# Patient Record
Sex: FEMALE | Race: WHITE | NOT HISPANIC OR LATINO | Employment: STUDENT | URBAN - METROPOLITAN AREA
[De-identification: names, ages, dates, MRNs, and addresses within clinical notes are randomized per-mention and may not be internally consistent; named-entity substitution may affect disease eponyms.]

---

## 2024-06-21 ENCOUNTER — PATIENT MESSAGE (OUTPATIENT)
Dept: DIABETES | Facility: CLINIC | Age: 24
End: 2024-06-21
Payer: COMMERCIAL

## 2024-06-24 DIAGNOSIS — E11.65 TYPE 2 DIABETES MELLITUS WITH HYPERGLYCEMIA, WITHOUT LONG-TERM CURRENT USE OF INSULIN: Primary | ICD-10-CM

## 2024-06-26 DIAGNOSIS — E11.65 TYPE 2 DIABETES MELLITUS WITH HYPERGLYCEMIA, WITHOUT LONG-TERM CURRENT USE OF INSULIN: ICD-10-CM

## 2024-06-26 RX ORDER — BLOOD-GLUCOSE SENSOR
EACH MISCELLANEOUS
Qty: 3 EACH | Refills: 11 | Status: SHIPPED | OUTPATIENT
Start: 2024-06-26

## 2024-06-26 RX ORDER — INSULIN ASPART INJECTION 100 [IU]/ML
10 INJECTION, SOLUTION SUBCUTANEOUS
Qty: 5 PEN | Refills: 1 | Status: SHIPPED | OUTPATIENT
Start: 2024-06-26 | End: 2025-06-26

## 2024-07-01 ENCOUNTER — PATIENT MESSAGE (OUTPATIENT)
Dept: DIABETES | Facility: CLINIC | Age: 24
End: 2024-07-01
Payer: COMMERCIAL

## 2024-07-01 DIAGNOSIS — E11.65 TYPE 2 DIABETES MELLITUS WITH HYPERGLYCEMIA, WITHOUT LONG-TERM CURRENT USE OF INSULIN: ICD-10-CM

## 2024-07-02 ENCOUNTER — TELEPHONE (OUTPATIENT)
Dept: DIABETES | Facility: CLINIC | Age: 24
End: 2024-07-02
Payer: COMMERCIAL

## 2024-07-02 RX ORDER — BLOOD-GLUCOSE SENSOR
EACH MISCELLANEOUS
Qty: 3 EACH | Refills: 11 | Status: SHIPPED | OUTPATIENT
Start: 2024-07-02

## 2024-07-02 NOTE — TELEPHONE ENCOUNTER
Pharmacy called regarding medication status for G7     PA Approval for G7 Sensors    Case Id:[id number]  Approved Coverage Start Date:05/09/2024  Coverage End Date:05/09/2025      Spoke with Pharmacy demographic information in pharmacy profile does not match. Ran claim for DME still not processing.   I called patient back to verify insurance- [employer] ID-[id number]    Patient encouraged to call insurance and verify primary and secondary insurances and then reach out to pharmacy

## 2024-07-03 ENCOUNTER — PATIENT MESSAGE (OUTPATIENT)
Dept: DIABETES | Facility: CLINIC | Age: 24
End: 2024-07-03
Payer: COMMERCIAL

## 2024-07-03 ENCOUNTER — TELEPHONE (OUTPATIENT)
Dept: DIABETES | Facility: CLINIC | Age: 24
End: 2024-07-03
Payer: COMMERCIAL

## 2024-07-03 DIAGNOSIS — E11.65 TYPE 2 DIABETES MELLITUS WITH HYPERGLYCEMIA, WITHOUT LONG-TERM CURRENT USE OF INSULIN: ICD-10-CM

## 2024-07-03 RX ORDER — INSULIN ASPART 100 [IU]/ML
10 INJECTION, SOLUTION INTRAVENOUS; SUBCUTANEOUS
Qty: 15 ML | Refills: 5 | Status: SHIPPED | OUTPATIENT
Start: 2024-07-03 | End: 2025-07-03

## 2024-07-03 RX ORDER — TIRZEPATIDE 2.5 MG/.5ML
2.5 INJECTION, SOLUTION SUBCUTANEOUS
Qty: 4 PEN | Refills: 1 | Status: SHIPPED | OUTPATIENT
Start: 2024-07-03

## 2024-07-03 NOTE — TELEPHONE ENCOUNTER
Spoke with patient regarding insurance update, once updated patient reported that she is taking Fiasp and that every time she takes insulin-Fiasp she has noticed that her glucose levels become elevated. She voiced that she compares to CGM and meter and first she calibrated and afterwards she noticed that it was still elevated. Patient would like to be advised on whether or not she should keep taking Fiasp     Please advise thank you, dexcom uploaded for view

## 2024-07-03 NOTE — TELEPHONE ENCOUNTER
Followed up with patient regarding insurance  verification for Dexcom G7 and Fiasp insulin. Was provided number for [employer] [phone], spoke with Dorene.    Prior auth was submitted in May 09, 2024 CaseId:[id number];Status:Approved;Review Type:Prior Auth;Coverage Start Date:05/09/2024;Coverage End Date:05/09/2025; Payer: [employer] Patient's Payer Case ID: [id number]   [phone] through [employer] which is no longer vaild    A new PA was submitted verbally through [employer]   Dexcom [id number]  [id number]    Expedited review has been requested within 24 hours  Chart note faxed to [phone]

## 2024-07-13 ENCOUNTER — LAB VISIT (OUTPATIENT)
Dept: LAB | Facility: HOSPITAL | Age: 24
End: 2024-07-13
Payer: COMMERCIAL

## 2024-07-13 DIAGNOSIS — E11.65 TYPE 2 DIABETES MELLITUS WITH HYPERGLYCEMIA, WITHOUT LONG-TERM CURRENT USE OF INSULIN: ICD-10-CM

## 2024-07-13 LAB
ESTIMATED AVG GLUCOSE: 180 MG/DL (ref 68–131)
HBA1C MFR BLD: 7.9 % (ref 4–5.6)

## 2024-07-13 PROCEDURE — 36415 COLL VENOUS BLD VENIPUNCTURE: CPT | Mod: PO | Performed by: NURSE PRACTITIONER

## 2024-07-13 PROCEDURE — 83036 HEMOGLOBIN GLYCOSYLATED A1C: CPT | Performed by: NURSE PRACTITIONER

## 2024-07-15 ENCOUNTER — OFFICE VISIT (OUTPATIENT)
Dept: DIABETES | Facility: CLINIC | Age: 24
End: 2024-07-15
Payer: COMMERCIAL

## 2024-07-15 ENCOUNTER — PATIENT MESSAGE (OUTPATIENT)
Dept: DIABETES | Facility: CLINIC | Age: 24
End: 2024-07-15

## 2024-07-15 DIAGNOSIS — E11.65 TYPE 2 DIABETES MELLITUS WITH HYPERGLYCEMIA, WITHOUT LONG-TERM CURRENT USE OF INSULIN: Primary | ICD-10-CM

## 2024-07-15 DIAGNOSIS — E11.69 HYPERLIPIDEMIA ASSOCIATED WITH TYPE 2 DIABETES MELLITUS: ICD-10-CM

## 2024-07-15 DIAGNOSIS — E78.5 HYPERLIPIDEMIA ASSOCIATED WITH TYPE 2 DIABETES MELLITUS: ICD-10-CM

## 2024-07-15 PROCEDURE — 99214 OFFICE O/P EST MOD 30 MIN: CPT | Mod: 95,,, | Performed by: NURSE PRACTITIONER

## 2024-07-15 PROCEDURE — 3051F HG A1C>EQUAL 7.0%<8.0%: CPT | Mod: CPTII,95,, | Performed by: NURSE PRACTITIONER

## 2024-07-15 RX ORDER — INSULIN ASPART 100 [IU]/ML
10 INJECTION, SOLUTION INTRAVENOUS; SUBCUTANEOUS
Qty: 15 ML | Refills: 5 | Status: SHIPPED | OUTPATIENT
Start: 2024-07-15 | End: 2025-07-15

## 2024-07-15 RX ORDER — INSULIN GLARGINE 300 [IU]/ML
50 INJECTION, SOLUTION SUBCUTANEOUS DAILY
Qty: 2 PEN | Refills: 5 | Status: SHIPPED | OUTPATIENT
Start: 2024-07-15 | End: 2025-07-15

## 2024-07-15 RX ORDER — TIRZEPATIDE 2.5 MG/.5ML
2.5 INJECTION, SOLUTION SUBCUTANEOUS
Qty: 4 PEN | Refills: 1 | Status: SHIPPED | OUTPATIENT
Start: 2024-07-15

## 2024-07-15 RX ORDER — TIRZEPATIDE 5 MG/.5ML
5 INJECTION, SOLUTION SUBCUTANEOUS
Qty: 4 PEN | Refills: 5 | Status: SHIPPED | OUTPATIENT
Start: 2024-07-15

## 2024-07-15 NOTE — PROGRESS NOTES
Subjective:         Patient ID: Jana Luther is a 24 y.o. female.  Patient's current PCP is Agapito Jason MD.       Chief Complaint: No chief complaint on file.      HPI  Jana Luther is a 24 y.o. White female presenting for a follow up for diabetes. Patient has been diagnosed with diabetes since 2018 .    Complications related to diabetes:  HTN, Hyperlipidemia, ; denies Pancreatitis; denies Gastroparesis; denies DKA; denies Hx/family Hx of MEN2/MTC; reports Frequent UTIs/yeast infections (with menstrual cycles)    Past failed treatment include: Metformin- GI upset, Jardiance- yeast, ozempic- GI upset    Blood glucose testing is performed regularly, with the Dexcom- BG have been elevated due to missed week of Mounjaro/shortage    Compliance:The patient reports medication compliance and diet noncompliance some of the time.  Activity: Sedentary- none .     The patient location is: home, [place]  The chief complaint leading to consultation is: DM follow up    Visit type: audiovisual    Face to Face time with patient: 30 minutes of total time spent on the encounter, which includes face to face time and non-face to face time preparing to see the patient (eg, review of tests), Obtaining and/or reviewing separately obtained history, Documenting clinical information in the electronic or other health record, Independently interpreting results (not separately reported) and communicating results to the patient/family/caregiver, or Care coordination (not separately reported). Each patient to whom he or she provides medical services by telemedicine is:  (1) informed of the relationship between the physician and patient and the respective role of any other health care provider with respect to management of the patient; and (2) notified that he or she may decline to receive medical services by telemedicine and may withdraw from such care at any time.         //   , There is no height or weight on file to calculate BMI.  Her blood  "sugar in clinic today is:   Lab Results   Component Value Date    POCGLU 250 (A) 03/03/2022       Labs reviewed and are noted below.  Her most recent A1C is:  Lab Results   Component Value Date    HGBA1C 7.9 (H) 07/13/2024    HGBA1C 7.5 (H) 04/09/2024    HGBA1C 8.5 (H) 12/09/2023     No results found for: "CPEPTIDE"  No results found for: "GLUTAMICACID"  Glucose   Date Value Ref Range Status   12/09/2023 254 (H) 70 - 110 mg/dL Final     Anion Gap   Date Value Ref Range Status   12/09/2023 9 8 - 16 mmol/L Final     eGFR if    Date Value Ref Range Status   07/12/2022 >60 >60 mL/min/1.73 m^2 Final     eGFR if non    Date Value Ref Range Status   07/12/2022 >60 >60 mL/min/1.73 m^2 Final     Comment:     Calculation used to obtain the estimated glomerular filtration  rate (eGFR) is the CKD-EPI equation.        Diabetes Management Status    Statin: Not taking  ACE/ARB: Not taking    Screening or Prevention Patient's value Goal Complete/Controlled?   HgA1C Testing and Control   Lab Results   Component Value Date    HGBA1C 7.9 (H) 07/13/2024      Annually/Less than 8% No   Lipid profile : 12/09/2023 Annually Yes   LDL control Lab Results   Component Value Date    LDLCALC 117.2 12/09/2023    Annually/Less than 100 mg/dl  No   Nephropathy screening Lab Results   Component Value Date    LABMICR 22.0 12/09/2023     No results found for: "PROTEINUA"  No results found for: "UTPCR"   Annually No   Blood pressure BP Readings from Last 1 Encounters:   01/30/24 122/80    Less than 140/90 Yes   Dilated retinal exam : 06/18/2021 Annually No   Foot exam   : 01/19/2024 Annually No       CURRENT DM MEDICATIONS:   Diabetes Medications               insulin (LANTUS SOLOSTAR U-100 INSULIN) glargine 100 units/mL SubQ pen Inject 42  Units into the skin every evening.          Review of Systems   Constitutional:  Negative for activity change and appetite change.        No symptoms of dehydration   Eyes:  Negative " for visual disturbance.   Gastrointestinal:  Positive for constipation (mild occasional hard stools). Negative for abdominal pain, diarrhea, nausea and vomiting.   Endocrine: Negative for polydipsia, polyphagia and polyuria.   Genitourinary:  Negative for dysuria and vaginal discharge.   Neurological:  Negative for syncope and weakness.   Psychiatric/Behavioral:  Negative for confusion.          Objective:      Physical Exam  Constitutional:       General: She is not in acute distress.     Appearance: She is not ill-appearing, toxic-appearing or diaphoretic.   Neurological:      Mental Status: She is alert.         Assessment:       1. Type 2 diabetes mellitus with hyperglycemia, without long-term current use of insulin    2. Hyperlipidemia associated with type 2 diabetes mellitus               Plan:   Type 2 diabetes mellitus with hyperglycemia, without long-term current use of insulin    Hyperlipidemia associated with type 2 diabetes mellitus          PLAN:   - Condition: uncontrolled    - Monitor blood glucose:  2x daily.Goals reviewed.   - Hypoglycemia protocol: reviewed routinely; risk discussed  - Diet: reviewed; Encouraged healthy low fat, low carb diet and increase physical activity  - BP and LDL- Rec lifestyle modifications and follow up with PCP for management.   - Medication Changes:   Increase - Mounjaro 5 mg once weekly. Continue Lantus to 50 units, Start Novolog once finished with Fiasp- pt wanted to finish supply  - I explained the importance of routine foot and eye exams, advised to see PCP annually for physical exams and monitoring for any drug related complications   - ACE/ARB recommended unless contraindicated   - The patient was explained the above plan and given opportunity to ask questions.  She understands, chooses and consents to this plan and accepts all the risks, which include but are not limited to the risks mentioned above.   - Labs ordered as above  - Nurse visit:  deferred  -she will  notify me via my chart if she is not able to get the Mounjaro 5 mg - so that insulin can be adjusted  - Follow up: 6 weeks- to discuss BG and Op5      Fiasp/ Novolog sliding scale        If blood pre-meal sugar is 151-200 take +2 units of Fiasp/Novolog  If blood pre-meal sugar is 201-250 take +4 units   If blood pre-meal sugar is 251-300 take +6 units   If blood pre-meal sugar is 301-350 take +8 units   If blood pre-meal sugar is 351-400+ take +10 units         A total of 30 minutes was spent in face to face time, of which over 50% was spent in counseling patient on disease process, complications, treatment, and side effects of medications.

## 2024-07-16 ENCOUNTER — TELEPHONE (OUTPATIENT)
Dept: DIABETES | Facility: CLINIC | Age: 24
End: 2024-07-16
Payer: COMMERCIAL

## 2024-07-17 DIAGNOSIS — E11.65 TYPE 2 DIABETES MELLITUS WITH HYPERGLYCEMIA, WITHOUT LONG-TERM CURRENT USE OF INSULIN: ICD-10-CM

## 2024-07-17 RX ORDER — BLOOD-GLUCOSE SENSOR
EACH MISCELLANEOUS
Qty: 3 EACH | Refills: 11 | Status: SHIPPED | OUTPATIENT
Start: 2024-07-17

## 2024-08-06 ENCOUNTER — PATIENT MESSAGE (OUTPATIENT)
Dept: DIABETES | Facility: CLINIC | Age: 24
End: 2024-08-06
Payer: COMMERCIAL

## 2024-08-07 ENCOUNTER — TELEPHONE (OUTPATIENT)
Dept: DIABETES | Facility: CLINIC | Age: 24
End: 2024-08-07
Payer: COMMERCIAL

## 2024-08-07 DIAGNOSIS — E11.65 TYPE 2 DIABETES MELLITUS WITH HYPERGLYCEMIA, WITHOUT LONG-TERM CURRENT USE OF INSULIN: ICD-10-CM

## 2024-08-07 DIAGNOSIS — E11.65 TYPE 2 DIABETES MELLITUS WITH HYPERGLYCEMIA, WITHOUT LONG-TERM CURRENT USE OF INSULIN: Primary | ICD-10-CM

## 2024-08-07 RX ORDER — DIABETIC SUPPLIES,MISCELL
1 MISCELLANEOUS MISCELLANEOUS ONCE
Qty: 1 EACH | Status: SHIPPED | OUTPATIENT
Start: 2024-08-07 | End: 2024-08-07 | Stop reason: SDUPTHER

## 2024-08-07 RX ORDER — BOLUS INSULIN PUMP, 200 UNIT 2 UNIT
EACH MISCELLANEOUS
Qty: 1 EACH | Refills: 11 | Status: SHIPPED | OUTPATIENT
Start: 2024-08-07 | End: 2024-08-08 | Stop reason: SDUPTHER

## 2024-08-07 RX ORDER — DIABETIC SUPPLIES,MISCELL
1 MISCELLANEOUS MISCELLANEOUS ONCE
Qty: 1 EACH | Refills: 0 | Status: SHIPPED | OUTPATIENT
Start: 2024-08-07 | End: 2024-08-08 | Stop reason: SDUPTHER

## 2024-08-08 DIAGNOSIS — E11.65 TYPE 2 DIABETES MELLITUS WITH HYPERGLYCEMIA, WITHOUT LONG-TERM CURRENT USE OF INSULIN: ICD-10-CM

## 2024-08-08 RX ORDER — DIABETIC SUPPLIES,MISCELL
1 MISCELLANEOUS MISCELLANEOUS ONCE
Qty: 1 EACH | Refills: 0 | Status: SHIPPED | OUTPATIENT
Start: 2024-08-08 | End: 2024-08-08

## 2024-08-08 RX ORDER — BOLUS INSULIN PUMP, 200 UNIT 2 UNIT
EACH MISCELLANEOUS
Qty: 1 EACH | Refills: 11 | Status: SHIPPED | OUTPATIENT
Start: 2024-08-08

## 2024-08-15 ENCOUNTER — TELEPHONE (OUTPATIENT)
Dept: DIABETES | Facility: CLINIC | Age: 24
End: 2024-08-15
Payer: COMMERCIAL

## 2024-08-15 NOTE — TELEPHONE ENCOUNTER
----- Message from Salvatore Edwards sent at 8/15/2024  9:04 AM CDT -----  Contact: [phone]  Type:  Patient Returning Call    Who Called:LANDON DAILEY [[id number]]  Who Left Message for Patient:  Does the patient know what this is regarding?:missed a call from your office  Would the patient rather a call back or a response via [facility]? Call back  Best Call Back Number: [phone]  Additional Information: n [phone]

## 2024-08-28 ENCOUNTER — OFFICE VISIT (OUTPATIENT)
Dept: DIABETES | Facility: CLINIC | Age: 24
End: 2024-08-28
Payer: COMMERCIAL

## 2024-08-28 DIAGNOSIS — E11.65 TYPE 2 DIABETES MELLITUS WITH HYPERGLYCEMIA, WITHOUT LONG-TERM CURRENT USE OF INSULIN: Primary | ICD-10-CM

## 2024-08-28 DIAGNOSIS — E11.69 HYPERLIPIDEMIA ASSOCIATED WITH TYPE 2 DIABETES MELLITUS: ICD-10-CM

## 2024-08-28 DIAGNOSIS — E78.5 HYPERLIPIDEMIA ASSOCIATED WITH TYPE 2 DIABETES MELLITUS: ICD-10-CM

## 2024-08-28 PROCEDURE — 1160F RVW MEDS BY RX/DR IN RCRD: CPT | Mod: CPTII,95,, | Performed by: NURSE PRACTITIONER

## 2024-08-28 PROCEDURE — 3051F HG A1C>EQUAL 7.0%<8.0%: CPT | Mod: CPTII,95,, | Performed by: NURSE PRACTITIONER

## 2024-08-28 PROCEDURE — 1159F MED LIST DOCD IN RCRD: CPT | Mod: CPTII,95,, | Performed by: NURSE PRACTITIONER

## 2024-08-28 PROCEDURE — 99214 OFFICE O/P EST MOD 30 MIN: CPT | Mod: 95,,, | Performed by: NURSE PRACTITIONER

## 2024-09-05 ENCOUNTER — CLINICAL SUPPORT (OUTPATIENT)
Dept: DIABETES | Facility: CLINIC | Age: 24
End: 2024-09-05
Payer: COMMERCIAL

## 2024-09-05 DIAGNOSIS — E11.65 TYPE 2 DIABETES MELLITUS WITH HYPERGLYCEMIA, WITHOUT LONG-TERM CURRENT USE OF INSULIN: Primary | ICD-10-CM

## 2024-09-05 PROCEDURE — 99999 PR PBB SHADOW E&M-EST. PATIENT-LVL II: CPT | Mod: PBBFAC,,, | Performed by: DIETITIAN, REGISTERED

## 2024-09-06 ENCOUNTER — TELEPHONE (OUTPATIENT)
Dept: DIABETES | Facility: CLINIC | Age: 24
End: 2024-09-06
Payer: COMMERCIAL

## 2024-09-06 VITALS — WEIGHT: 261.38 LBS | HEIGHT: 67 IN | BODY MASS INDEX: 41.02 KG/M2

## 2024-09-06 DIAGNOSIS — E11.65 TYPE 2 DIABETES MELLITUS WITH HYPERGLYCEMIA, WITHOUT LONG-TERM CURRENT USE OF INSULIN: Primary | ICD-10-CM

## 2024-09-06 NOTE — TELEPHONE ENCOUNTER
Adam May, patient was seen yesterday for pump eval for Omnipod 5 and she agrees to move forward with this insulin pump.  She would like to stay on Dexcom G7 to integrate with the Omnipod 5 system which it is my understanding that it still can only be ordered through [facility].   Food Recall of 9/5/24 was completed with accompanying carbohydrate count.  Patient is taking 14 units of Fiasp/Novolog before each meal she consumes at home, is not utilizing the chart you prescribed, and skips lunch doses. Confirmed she is capable of carbohydrate counting. She was instructed to add insulin dose and carbohydrate counts to Dexcom events for your calculations for insulin pump start orders.  Dexcom report attached.      Marija, diabetes education

## 2024-09-06 NOTE — PROGRESS NOTES
"Diabetes Care Specialist Progress Note  Author: Marija Gibbs RD  Date: 9/6/2024    Intake    Program Intake  Reason for Diabetes Program Visit:: Intervention  Type of Intervention:: Individual  Individual: Education  Education: Insulin Pump Evaluation  Current diabetes risk level:: low  In the last 12 months, have you:: none  Permission to speak with others about care:: no    Current Diabetes Treatment: Insulin, DM Injectables  DM Injectables Type/Dose: Mounjaro 5 mg/weekly  Method of insulin delivery?: Injections  Injection Type: Pens  Pen Type/Dose: 10 units Fiasp/Novolog /25.  She is using flat doses of 14 units. 40 units of Toujeo Max.    Continuous Glucose Monitoring  Patient has CGM: Yes  Personal CGM type:: Dexcom G7  GMI Date: 09/05/24  GMI Value: 7.5 %      Lab Results   Component Value Date    HGBA1C 7.9 (H) 07/13/2024       Weight: 118.6 kg (261 lb 6.4 oz)   Height: 5' 7" (170.2 cm)   Body mass index is 40.94 kg/m².    Lifestyle Coping Support & Clinical    Lifestyle/Coping/Support  List anything about Diabetes that causes you stress?: remembering to take Fiasp before meals away from home  Learning Barriers:: None  Culture or Mu-ism beliefs that may impact ability to access healthcare: No  Psychosocial/Coping Skills Assessment Completed: : Yes  Assessment indicates:: Adequate understanding  Area of need?: No    Problem Review  Active Comorbidities: Hyperlipidemia/Dyslipidemia    Diabetes Self-Management Skills Assessment    Medication Skills Assessment  Patient is able to identify current diabetes medications, dosages, and appropriate timing of medications.: yes  Patient reports problems or concerns with current medication regimen.: yes  Medication regimen problems/concerns:: does not feel like regimen is working  Patient is  aware that some diabetes medications can cause low blood sugar?: Yes  Medication Skills Assessment Completed:: Yes  Assessment indicates:: Instruction Needed, Adequate " understanding  Area of need?: Yes    Diabetes Disease Process/Treatment Options  Diabetes Type?: Type II  When were you diagnosed?: 2018  What are your goals for this education session?: pump evaluation  Is patient aware of what causes diabetes?: Yes  Does patient understand the pathophysiology of diabetes?: Yes  Area of need?: No    Nutrition/Healthy Eating  Meal Plan 24 Hour Recall - Breakfast: V8 pomegranate blueberry energy drink 20 g carbohydrates  Meal Plan 24 Hour Recall - Lunch: Small rocker apple 10 g carbohydrates, 2 flatbread everything seasoned crackers 17 g carbohydratess, salad with everything seasoned dressing 21 g carbohydrates, 1/2 cup cumcumber slices 2 g carbohydrates, and 1/2 cup tomato slices 3 g carbohydrates, 1 tablespoon sunflower seeds 2 g carbohydrates., 8 gummy bears 22 g carbohydrates  Meal Plan 24 Hour Recall - Beverage: water  Who shops/cooks?: patient and wife  Patient can identify foods that impact blood sugar.: yes  Challenges to healthy eating:: other (see comments) (skipping breakfast)  Nutrition/Healthy Eating Skills Assessment Completed:: Yes  Assessment indicates:: Instruction Needed  Area of need?: Yes    Physical Activity/Exercise  Patient's daily activity level:: moderately active  Patient formally exercises outside of work.: yes  Frequency: twice a week  Patient can identify forms of physical activity.: yes  Physical Activity/Exercise Skills Assessment Completed: : Yes  Assessment indicates:: Adequate understanding  Area of need?: No    Home Blood Glucose Monitoring  Patient states that blood sugar is checked at home daily.: yes  Monitoring Method:: personal continuous glucose monitor  Personal CGM type:: Dexcom G7   What is your current Time in Range?: 57%  What is your A1c Target?: >7%  Home Blood Glucose Monitoring Skills Assessment Completed: : Yes  Assessment indicates:: Adequate understanding  Area of need?: No    Assessment Summary and Plan    Based on today's  diabetes care assessment, the following areas of need were identified:          9/5/2024    12:01 AM   Areas of Need   Medications/Current Diabetes Treatment Yes, see care plan   Lifestyle Coping Support No   Diabetes Disease Process/Treatment Options No   Nutrition/Healthy Eating Yes, see care plan   Physical Activity/Exercise No   Home Blood Glucose Monitoring Continue use of Dexcom G7       Today's interventions were provided through individual discussion, instruction, and written materials were provided.      Patient verbalized understanding of instruction and written materials.  Pt was able to return back demonstration of instructions today. Patient understood key points, needs reinforcement and further instruction.     Diabetes Self-Management Care Plan:    Today's Diabetes Self-Management Care Plan was developed with Jana's input. Jana has agreed to work toward the following goal(s) to improve his/her overall diabetes control.      Care Plan: Diabetes Management   Updates made since 8/7/2024 12:00 AM        Problem: Medications         Goal: Patient Agrees to consider Omnipod 5 to manage blood glucose levels.    Start Date: 9/5/2024   Expected End Date: 11/7/2024   Priority: High   Barriers: No Barriers Identified   Note:    Patient is interested in an insulin pump and seen today to discuss insulin pump therapy.     -Covered expectations for insulin pump approval by provider and insurance company such as: SMBG a min of qid, additional labs, insurance verification, possible costs associated with monthly pump supplies, and overall cost.     --Discussed basic details of pump therapy with patient.     -Reviewed current insulin pumps available: Medtronic 630G/770G/780G, Tandem T-slim X2, OmniPod Classic/Dash/5, and VGo    -Reviewed hybrid closed-loop insulin pump systems.  Hybrid closed-loop systems have some hands-off (automated) actions. These capabilities can vary among systems. That said, basic communication  happens between the CGM and pump, which can trigger some automatic adjustments. This helps to keep your glucose within a preset range. Pumps with this capability include: Medtronic 770G/780G with Guardian 3 or 4 CGM, Tandem T-Slim X2 integrated with Dexcom G6 CGM (Control IQ technology), and OmniPod 5 integrated with Dexcom G6 CGM    -Reviewed terms such as insulin sensitivity factor (ISF), carbohydrate ratio, target glucose, bolus, basal, active insulin and insulin on board.  Explained importance of learning to carb count at meals to effectively enter carbs (grams) when bolusing at meals.      -Explained each insulin pumps allow for different max volumes of insulin in reservoir chamber. Medtronic and Tandem max volume is 300 units and Omnipod max volume is 200 units.     -Patient verbalized understanding of pump therapy and able to see and touch all demonstration pumps during visit today.      Patient's is interested in Omnipod 5.    Patient's major concern is insurance coverage and would like to move forward with checking insurance coverage for Omniopd 5.  Will send patient's request for prescription for Omnipod 5 to [facility] with patient's permission today.  Patient given booklets on Omnipod 5 products and asked to research the products encouraging patient to contact us with any additional questions.          Task: Reviewed with patient all current diabetes medications and provided basic review of the purpose, dosage, frequency, side effects, and storage of both oral and injectable diabetes medications.         Task: Reviewed possible resources for acquiring cost prohibitive medication.         Task: Instructed patient on how to self-administer         Task: Discussed guidelines for preventing, detecting and treating hypoglycemia and hyperglycemia and reviewed the importance of meal and medication timing with diabetes mediations for prevention of hypoglycemia and maximum drug benefit.         Problem: Healthy  "Eating         Goal: Eat 3 meals daily with 30-45g + protein. Patient will start carbohydrate counting and entering into Dexcom events.    Start Date: 9/5/2024   Expected End Date: 11/7/2024   Priority: Medium   Barriers: No Barriers Identified   Note:    Patient confirmed to be able to accurately count carbohydrates. Discussed need to start practicing and entering into Dexcom events along with her insulin doses.       Task: Reviewed the sources and role of Carbohydrate, Protein, and Fat and how each nutrient impacts blood sugar. Completed 9/6/2024        Task: Provided visual examples using dry measuring cups, food models, and other familiar objects such as computer mouse, deck or cards, tennis ball etc. to help with visualization of portions.         Task: Explained how to count carbohydrates using the food label and the use of dry measuring cups for accurate carb counting. Completed 9/6/2024        Task: Discussed strategies for choosing healthier menu options when dining out.         Task: Recommended replacing beverages containing high sugar content with noncaloric/sugar free options and/or water.         Task: Review the importance of balancing carbohydrates with each meal using portion control techniques to count servings of carbohydrate and label reading to identify serving size and amount of total carbs per serving. Completed 9/6/2024        Task: Provided Sample plate method and reviewed the use of the plate to estimate amounts of carbohydrate per meal. Completed 9/6/2024          Follow Up Plan     Follow up when she receives Omnipod 5 supplies to be scheduled for pump start..  Patient presents today to learn more about Omnipod 5 to manage her BG.  She currently uses Dexcom G7 and would like to continue to do so.  Will request her Rx to be sent to [facility] since these pods are available through that pharmacy only.        Patient does not follow her prescribed Fiasp/Novolog doses because "I feel better " "when I run at 120" mg/dl.  She reports "it does not give me enough insulin." She administers 14 units at each meal she consumes at home.    Carbohydrate count for intake of today provided below.  Confirmed patient can accurately carbohydrate count.  Patient instructed to start adding carbohydrate count of meals to Dexcom events.    Recall for 9/5/24:   Breakfast:   V8 pomegranate blueberry energy drink. 20 g total carbohydrates. 14 units insulin.     Lunch:  Small rocker apple 10 g carbohydrates, 2 flatbread everything seasoned crackers 17 g carbohydratess, salad with everything seasoned dressing 21 g carbohydrates, 1/2 cup cumcumber slices 2 g carbohydrates, and 1/2 cup tomato slices 3 g carbohydrates, 1 tablespoon sunflower seeds 2 g carbohydrates., 8 gummy bears 22 g carbohydrates. Total carbohydrates 77 g. 0 units insulin.             Today's care plan and follow up schedule was discussed with patient.  Jana verbalized understanding of the care plan, goals, and agrees to follow up plan.        The patient was encouraged to communicate with his/her health care provider/physician and care team regarding his/her condition(s) and treatment.  I provided the patient with my contact information today and encouraged to contact me via phone or [facility] Patient Portal as needed.     Length of Visit   Total Time: 80 Minutes   "

## 2024-09-09 DIAGNOSIS — E11.65 TYPE 2 DIABETES MELLITUS WITH HYPERGLYCEMIA, WITHOUT LONG-TERM CURRENT USE OF INSULIN: Primary | ICD-10-CM

## 2024-09-09 RX ORDER — INSULIN PMP CART,AUT,G6/7,CNTR
1 EACH SUBCUTANEOUS
Qty: 30 EACH | Refills: 3 | Status: SHIPPED | OUTPATIENT
Start: 2024-09-09

## 2024-09-09 RX ORDER — INSULIN PMP CART,AUT,G6/7,CNTR
EACH SUBCUTANEOUS
Status: CANCELLED | OUTPATIENT
Start: 2024-09-09

## 2024-09-09 RX ORDER — INSULIN PMP CART,AUT,G6/7,CNTR
1 EACH SUBCUTANEOUS ONCE
Qty: 1 EACH | Refills: 0 | Status: SHIPPED | OUTPATIENT
Start: 2024-09-09 | End: 2024-09-09

## 2024-09-11 ENCOUNTER — PATIENT MESSAGE (OUTPATIENT)
Dept: FAMILY MEDICINE | Facility: CLINIC | Age: 24
End: 2024-09-11
Payer: COMMERCIAL

## 2024-09-12 ENCOUNTER — TELEPHONE (OUTPATIENT)
Dept: DIABETES | Facility: CLINIC | Age: 24
End: 2024-09-12
Payer: COMMERCIAL

## 2024-09-12 ENCOUNTER — PATIENT MESSAGE (OUTPATIENT)
Dept: DIABETES | Facility: CLINIC | Age: 24
End: 2024-09-12
Payer: COMMERCIAL

## 2024-09-12 RX ORDER — INSULIN ASPART 100 [IU]/ML
INJECTION, SOLUTION INTRAVENOUS; SUBCUTANEOUS
Qty: 30 ML | Refills: 11 | Status: SHIPPED | OUTPATIENT
Start: 2024-09-12

## 2024-09-12 NOTE — TELEPHONE ENCOUNTER
Adam May, I am requesting insulin pump start orders for this patient's Omnipod 5 integrated with Dexcom G7.  You have prescribed her 10 units Fiasp/Novolog with correction above 150 with an ISF of 25.  She revealed in the insulin pump eval that she is taking a flat dose of 14 units at all meals.  I requested she start adding insulin doses and carbohydrate count under events in Dexcom which I do note is being done some days.  She takes the prescribed 50 units of Toujeo Max.         I am also requesting vials of Novolog to be used to fill the Omnipods be sent to this pharmacy   [facility] [address]     Dexcom Clarity report is attached to this phone note.    Thank you,  Marija, diabetes education

## 2024-09-13 ENCOUNTER — TELEPHONE (OUTPATIENT)
Dept: DIABETES | Facility: CLINIC | Age: 24
End: 2024-09-13
Payer: COMMERCIAL

## 2024-09-13 NOTE — TELEPHONE ENCOUNTER
TDD 92   Pump TDD 69   Total basal dose 1.5   Insulin to carb ratio 6.5   Correction factor 25   Max basal 2   Max bolus 25   Bg target 120   Correct above 130   Duration 4 hours

## 2024-09-17 NOTE — TELEPHONE ENCOUNTER
Hi, thank you for the pump start orders. Yes, they are visible.  She takes 50 units of Toujeo at night.  Pump start is for 2 pm. How many units of Toujeo do you want her to take the night before?

## 2024-09-24 ENCOUNTER — NUTRITION (OUTPATIENT)
Dept: DIABETES | Facility: CLINIC | Age: 24
End: 2024-09-24
Payer: COMMERCIAL

## 2024-09-24 DIAGNOSIS — Z46.81 INSULIN PUMP TRAINING: ICD-10-CM

## 2024-09-24 DIAGNOSIS — E11.65 TYPE 2 DIABETES MELLITUS WITH HYPERGLYCEMIA, WITHOUT LONG-TERM CURRENT USE OF INSULIN: Primary | ICD-10-CM

## 2024-09-24 PROCEDURE — G0108 DIAB MANAGE TRN  PER INDIV: HCPCS | Mod: S$GLB,,, | Performed by: DIETITIAN, REGISTERED

## 2024-09-24 NOTE — PROGRESS NOTES
Diabetes Care Specialist Progress Note  Author: Abby Patton RD, CDE  Date: 9/24/2024    Intake    Program Intake  Reason for Diabetes Program Visit:: Intervention-Patient back in clinic to have her Omni Pod 5 insulin pump set up.  She currently wears Dexcom G7 and has compatible pods to continue with them.  Type of Intervention:: Individual  Individual: Device Training  Device Training: Insulin Pump Start  Current diabetes risk level:: moderate  In the last 12 months, have you:: none    Current Diabetes Treatment: Insulin, DM Injectables    Continuous Glucose Monitoring  Patient has CGM: Yes  Personal CGM type::  (Dexcom G7)    Lab Results   Component Value Date    HGBA1C 7.9 (H) 07/13/2024     Diabetes Self-Management Skills Assessment    Medication Skills Assessment  Patient is able to identify current diabetes medications, dosages, and appropriate timing of medications.: yes  Patient is  aware that some diabetes medications can cause low blood sugar?: Yes  Medication Skills Assessment Completed:: Yes  Assessment indicates:: Instruction Needed  Area of need?: Yes    Home Blood Glucose Monitoring  Personal CGM type::  Dexcom G7    Assessment Summary and Plan    Based on today's diabetes care assessment, the following areas of need were identified:          9/24/2024    12:01 AM   Areas of Need   Medications/Current Diabetes Treatment Yes     Today's interventions were provided through individual discussion, instruction, and written materials were provided.      Patient verbalized understanding of instruction and written materials.  Pt was able to return back demonstration of instructions today. Patient understood key points, needs reinforcement and further instruction.     Diabetes Self-Management Care Plan:    Today's Diabetes Self-Management Care Plan was developed with Jana's input. Jana has agreed to work toward the following goal(s) to improve his/her overall diabetes control.      Care Plan: Diabetes  Management   Updates made since 8/25/2024 12:00 AM        Problem: Medications         Long-Range Goal: Patient to transition to Omni Pod insulin pump.    Start Date: 9/24/2024   Priority: Medium   Barriers: No Barriers Identified   Note:    Patient is interested in an insulin pump and seen today to discuss insulin pump therapy.     -Covered expectations for insulin pump approval by provider and insurance company such as: [employer] a min of qid, additional labs, insurance verification, possible costs associated with monthly pump supplies, and overall cost.     --Discussed basic details of pump therapy with patient.     -Reviewed current insulin pumps available: Medtronic 630G/770G/780G, Tandem T-slim X2, OmniPod Classic/Dash/5, and VGo    -Reviewed hybrid closed-loop insulin pump systems.  Hybrid closed-loop systems have some hands-off (automated) actions. These capabilities can vary among systems. That said, basic communication happens between the CGM and pump, which can trigger some automatic adjustments. This helps to keep your glucose within a preset range. Pumps with this capability include: Medtronic 770G/780G with Guardian 3 or 4 CGM, Tandem T-Slim X2 integrated with Dexcom G6 CGM (Control IQ technology), and OmniPod 5 integrated with Dexcom G6 CGM    -Reviewed terms such as insulin sensitivity factor (ISF), carbohydrate ratio, target glucose, bolus, basal, active insulin and insulin on board.  Explained importance of learning to carb count at meals to effectively enter carbs (grams) when bolusing at meals.      -Explained each insulin pumps allow for different max volumes of insulin in reservoir chamber. Medtronic and Tandem max volume is 300 units and Omnipod max volume is 200 units.     -Patient verbalized understanding of pump therapy and able to see and touch all demonstration pumps during visit today.      Patient's is interested in Omnipod 5.    Patient's major concern is insurance coverage and would like  to move forward with checking insurance coverage for Omniopd 5.  Will send patient's request for prescription for Omnipod 5 to Yadira Roy with patient's permission today.  Patient given booklets on Omnipod 5 products and asked to research the products encouraging patient to contact us with any additional questions.       OMNIPOD 5 INSULIN PUMP START  Pump training was provided per Omni Pod protocol.     Patient understands she will no longer take Toujeo injections daily.  Details of pump therapy were covered included following: controller features and programming, pod activation, pod site selection and rotation, automatic pod priming and insertion, setting & editing basal rates in manual mode, giving bolus and other features in the set up menu.  Patient demonstrated ability to program controller, activate and insert pod using aseptic technique.  Patient demonstrated ability to program Dexcom transmitter into controller and start automated limited mode.    Instructed pt on use of basic pump features ie...give a bolus, pause insulin, switch from manual to automated mode.  Reviewed features available in manual mode verses automated mode.   Reviewed when and how to use activity function in automated mode.  Reviewed site selection of pods, rotation of sites and hard stop to change pod every 72 hrs.   Instructed that insulin vial is good out of refrigeration for up to 28-30 days .   Reviewed treatment of hypoglycemia, hyperglycemia; sick day care, DKA, and troubleshooting of pump.  Omni Pod 24 hour support can be reached at [phone].     INITIAL SETTINGS: (per provider Yadira Roy)    Basal rate: 1.5/hr  Maximum basal: 3u/hr     Bolus Menu:  ISF: 1:25  Carb Ratio : 1:6.5  Blood glucose target: 120; correct above: 130  Active insulin: 4 hrs  Maximum bolus = 25 units  Reverse Correction: ON    Low pod insulin: 20 units  Pod expiration alarm:  4 hours    Patient has written materials for Omnipod 5 for home use.   Patient verbalized understanding of all instructions given.  Reviewed back up plan in case of pump malfunction.  Educated that if glucose levels increasing and patient is delivering insulin, it is recommended to change pod.      Patient set up her Omni Pod account before coming to her visit.  We set up her glooko account and linked the two while she was here today.  Will check with the [facility] to see if they will f/u with her or we should down here before her next appointment with Yadira.         Problem: Healthy Eating         Goal: Eat 3 meals daily with 30-45g + protein. Patient will start carbohydrate counting and entering into Dexcom events.    Start Date: 9/5/2024   Expected End Date: 11/7/2024   This Visit's Progress: On track   Priority: Medium   Barriers: No Barriers Identified   Note:    Patient confirmed to be able to accurately count carbohydrates. Discussed need to start practicing and entering into Dexcom events along with her insulin doses.    9/24/24- Patient doing well with looking up and logging carb values.  She has spread sheet with her on her computer today.  She has not started dosing insulin based on carb values yet though.  Discussed that her provider will start with a 6.5 carb ratio to start.       Task: Reviewed the sources and role of Carbohydrate, Protein, and Fat and how each nutrient impacts blood sugar. Completed 9/6/2024        Task: Provided visual examples using dry measuring cups, food models, and other familiar objects such as computer mouse, deck or cards, tennis ball etc. to help with visualization of portions.         Task: Explained how to count carbohydrates using the food label and the use of dry measuring cups for accurate carb counting. Completed 9/6/2024        Task: Discussed strategies for choosing healthier menu options when dining out.         Task: Recommended replacing beverages containing high sugar content with noncaloric/sugar free options and/or  water.         Task: Review the importance of balancing carbohydrates with each meal using portion control techniques to count servings of carbohydrate and label reading to identify serving size and amount of total carbs per serving. Completed 9/6/2024        Task: Provided Sample plate method and reviewed the use of the plate to estimate amounts of carbohydrate per meal. Completed 9/6/2024        Follow Up Plan     Follow up in about 2 weeks (around 10/8/2024).  Will send message this week to assess progress with new pump and schedule f/u in 2 weeks unless Yadira would rather her be seen by an educator in her clinic.  She prefers late afternoon appointments and can do virtual if needed.      Today's care plan and follow up schedule was discussed with patient.  Jana verbalized understanding of the care plan, goals, and agrees to follow up plan.        The patient was encouraged to communicate with his/her health care provider/physician and care team regarding his/her condition(s) and treatment.  I provided the patient with my contact information today and encouraged to contact me via phone or [facility] Patient Portal as needed.     Length of Visit   Total Time: 90 Minutes

## 2024-09-25 ENCOUNTER — PATIENT MESSAGE (OUTPATIENT)
Dept: DIABETES | Facility: CLINIC | Age: 24
End: 2024-09-25
Payer: COMMERCIAL

## 2024-10-05 ENCOUNTER — LAB VISIT (OUTPATIENT)
Dept: LAB | Facility: HOSPITAL | Age: 24
End: 2024-10-05
Attending: NURSE PRACTITIONER
Payer: COMMERCIAL

## 2024-10-05 DIAGNOSIS — E11.65 TYPE 2 DIABETES MELLITUS WITH HYPERGLYCEMIA, WITHOUT LONG-TERM CURRENT USE OF INSULIN: ICD-10-CM

## 2024-10-05 LAB
ESTIMATED AVG GLUCOSE: 103 MG/DL (ref 68–131)
GLUCOSE SERPL-MCNC: 137 MG/DL (ref 70–110)
HBA1C MFR BLD: 5.2 % (ref 4–5.6)

## 2024-10-05 PROCEDURE — 36415 COLL VENOUS BLD VENIPUNCTURE: CPT | Mod: PO | Performed by: NURSE PRACTITIONER

## 2024-10-05 PROCEDURE — 86341 ISLET CELL ANTIBODY: CPT | Performed by: NURSE PRACTITIONER

## 2024-10-05 PROCEDURE — 83036 HEMOGLOBIN GLYCOSYLATED A1C: CPT | Performed by: NURSE PRACTITIONER

## 2024-10-05 PROCEDURE — 84681 ASSAY OF C-PEPTIDE: CPT | Performed by: NURSE PRACTITIONER

## 2024-10-05 PROCEDURE — 82947 ASSAY GLUCOSE BLOOD QUANT: CPT | Performed by: NURSE PRACTITIONER

## 2024-10-07 LAB — C PEPTIDE SERPL-MCNC: 1.66 NG/ML (ref 0.78–5.19)

## 2024-10-09 ENCOUNTER — CLINICAL SUPPORT (OUTPATIENT)
Dept: DIABETES | Facility: CLINIC | Age: 24
End: 2024-10-09
Payer: COMMERCIAL

## 2024-10-09 DIAGNOSIS — Z46.81 INSULIN PUMP FITTING OR ADJUSTMENT: Primary | ICD-10-CM

## 2024-10-09 DIAGNOSIS — E11.65 TYPE 2 DIABETES MELLITUS WITH HYPERGLYCEMIA, WITHOUT LONG-TERM CURRENT USE OF INSULIN: ICD-10-CM

## 2024-10-09 LAB — GAD65 AB SER-SCNC: 0 NMOL/L

## 2024-10-09 PROCEDURE — G0108 DIAB MANAGE TRN  PER INDIV: HCPCS | Mod: 95,,, | Performed by: DIETITIAN, REGISTERED

## 2024-10-09 NOTE — PROGRESS NOTES
Diabetes Care Specialist Progress Note  Author: Abby Patton RD, CDE  Date: 10/9/2024    Diabetes Care Specialist Virtual Visit Note       The patient location is: home in [place]  The chief complaint leading to consultation is: Diabetes  Visit type: audiovisual  Total time spent with patient: 30 min   Each patient to whom he or she provides medical services by telemedicine is:  (1) informed of the relationship between the physician and patient and the respective role of any other health care provider with respect to management of the patient; and (2) notified that he or she may decline to receive medical services by telemedicine and may withdraw from such care at any time.     Intake    Program Intake  Reason for Diabetes Program Visit:: Intervention-Patient was seen virtually today to review glooko reports and see how she was doing with her new insulin pump.  She reports no issues today and liking the pump so far.  Dexcom and glooko reports were uploaded to media.  Type of Intervention:: Individual  Individual: Education  Education: Advanced Pump  Current diabetes risk level:: low  In the last 12 months, have you:: none    Current Diabetes Treatment: Insulin  Method of insulin delivery?: Insulin Pump  Does patient have back-up plan?: Yes  Any problems obtaining supplies?: No    Continuous Glucose Monitoring  Patient has CGM: Yes  Personal CGM type::  (Dexcom G7)  GMI Date: 10/09/24  GMI Value: 6.7 %    Lab Results   Component Value Date    HGBA1C 5.2 10/05/2024     Diabetes Self-Management Skills Assessment    Medication Skills Assessment  Patient is able to identify current diabetes medications, dosages, and appropriate timing of medications.: yes  Patient reports problems or concerns with current medication regimen.: yes  Patient is  aware that some diabetes medications can cause low blood sugar?: Yes  Medication Skills Assessment Completed:: Yes  Assessment indicates:: Adequate understanding  Area of need?:  No    Home Blood Glucose Monitoring  Personal CGM type::  (Dexcom G7)      Assessment Summary and Plan    Based on today's diabetes care assessment, the following areas of need were identified:          10/9/2024   Areas of Need   Medications/Current Diabetes Treatment No- care plan updated      Today's interventions were provided through individual discussion, instruction, and written materials were provided.      Patient verbalized understanding of instruction and written materials.  Pt was able to return back demonstration of instructions today. Patient understood key points, needs reinforcement and further instruction.     Diabetes Self-Management Care Plan:    Today's Diabetes Self-Management Care Plan was developed with Jana's input. Jana has agreed to work toward the following goal(s) to improve his/her overall diabetes control.      Care Plan: Diabetes Management   Updates made since 9/9/2024 12:00 AM        Problem: Medications         Long-Range Goal: Patient to transition to Omni Pod insulin pump.    Start Date: 9/24/2024   This Visit's Progress: On track   Priority: Medium   Barriers: No Barriers Identified   Note:    Patient is interested in an insulin pump and seen today to discuss insulin pump therapy.     -Covered expectations for insulin pump approval by provider and insurance company such as: [employer] a min of qid, additional labs, insurance verification, possible costs associated with monthly pump supplies, and overall cost.     --Discussed basic details of pump therapy with patient.     -Reviewed current insulin pumps available: Medtronic 630G/770G/780G, Tandem T-slim X2, OmniPod Classic/Dash/5, and VGo    -Reviewed hybrid closed-loop insulin pump systems.  Hybrid closed-loop systems have some hands-off (automated) actions. These capabilities can vary among systems. That said, basic communication happens between the CGM and pump, which can trigger some automatic adjustments. This helps to keep your  glucose within a preset range. Pumps with this capability include: Medtronic 770G/780G with Guardian 3 or 4 CGM, Tandem T-Slim X2 integrated with Dexcom G6 CGM (Control IQ technology), and OmniPod 5 integrated with Dexcom G6 CGM    -Reviewed terms such as insulin sensitivity factor (ISF), carbohydrate ratio, target glucose, bolus, basal, active insulin and insulin on board.  Explained importance of learning to carb count at meals to effectively enter carbs (grams) when bolusing at meals.      -Explained each insulin pumps allow for different max volumes of insulin in reservoir chamber. Medtronic and Tandem max volume is 300 units and Omnipod max volume is 200 units.     -Patient verbalized understanding of pump therapy and able to see and touch all demonstration pumps during visit today.      Patient's is interested in Omnipod 5.    Patient's major concern is insurance coverage and would like to move forward with checking insurance coverage for Omniopd 5.  Will send patient's request for prescription for Omnipod 5 to Yadira Roy with patient's permission today.  Patient given booklets on Omnipod 5 products and asked to research the products encouraging patient to contact us with any additional questions.       OMNIPOD 5 INSULIN PUMP START  Pump training was provided per Omni Pod protocol.     Patient understands she will no longer take Toujeo injections daily.  Details of pump therapy were covered included following: controller features and programming, pod activation, pod site selection and rotation, automatic pod priming and insertion, setting & editing basal rates in manual mode, giving bolus and other features in the set up menu.  Patient demonstrated ability to program controller, activate and insert pod using aseptic technique.  Patient demonstrated ability to program Dexcom transmitter into controller and start automated limited mode.    Instructed pt on use of basic pump features ie...give a bolus, pause  insulin, switch from manual to automated mode.  Reviewed features available in manual mode verses automated mode.   Reviewed when and how to use activity function in automated mode.  Reviewed site selection of pods, rotation of sites and hard stop to change pod every 72 hrs.   Instructed that insulin vial is good out of refrigeration for up to 28-30 days .   Reviewed treatment of hypoglycemia, hyperglycemia; sick day care, DKA, and troubleshooting of pump.  Omni Pod 24 hour support can be reached at [phone].     INITIAL SETTINGS: (per provider Yadira Roy)    Basal rate: 1.5/hr  Maximum basal: 3u/hr     Bolus Menu:  ISF: 1:25  Carb Ratio : 1:6.5  Blood glucose target: 120; correct above: 130  Active insulin: 4 hrs  Maximum bolus = 25 units  Reverse Correction: ON    Low pod insulin: 20 units  Pod expiration alarm:  4 hours    Patient has written materials for Omnipod 5 for home use.  Patient verbalized understanding of all instructions given.  Reviewed back up plan in case of pump malfunction.  Educated that if glucose levels increasing and patient is delivering insulin, it is recommended to change pod.      Patient set up her Omni Pod account before coming to her visit.  We set up her [employer] account and linked the two while she was here today.  Will check with the [facility] to see if they will f/u with her or we should down here before her next appointment with Yadira.      10/9/24- Reports reviewed.  Patient reports one episode of hypoglycemia under 70 when she ate less carbs at lunch on Monday.  Otherwise she is pleased with the pump.  She is finding that she has enough insulin to last her the three days with her pod.  She has several questions related to recent labwork taken and will discuss with Yadira at her upcoming f/u visit.  Encouraged her to call or send a message if any new blood sugar patterns would develop before then.       Follow Up Plan     Follow up if symptoms worsen or fail to  improve.  She next f/u with Yadira in scheduled for 10/23 and encouraged to call in interim with questions/concerns.      Today's care plan and follow up schedule was discussed with patient.  Jana verbalized understanding of the care plan, goals, and agrees to follow up plan.        The patient was encouraged to communicate with his/her health care provider/physician and care team regarding his/her condition(s) and treatment.  I provided the patient with my contact information today and encouraged to contact me via phone or [facility] Patient Portal as needed.     Length of Visit   Total Time: 30 Minutes

## 2024-10-22 ENCOUNTER — PATIENT MESSAGE (OUTPATIENT)
Dept: DIABETES | Facility: CLINIC | Age: 24
End: 2024-10-22
Payer: COMMERCIAL

## 2024-11-18 ENCOUNTER — PATIENT MESSAGE (OUTPATIENT)
Dept: DIABETES | Facility: CLINIC | Age: 24
End: 2024-11-18
Payer: COMMERCIAL

## 2024-11-27 ENCOUNTER — PATIENT MESSAGE (OUTPATIENT)
Dept: DIABETES | Facility: CLINIC | Age: 24
End: 2024-11-27

## 2024-12-03 NOTE — TELEPHONE ENCOUNTER
I reached out to  to schedule her a sooner visit with a provider in the [facility], she requested to be seen virtually and for next available appt. I offered her a appt with Blessing Negrete NP on tmr 12/4/24 @7am. Patient agreed to this date and time.

## 2024-12-04 ENCOUNTER — OFFICE VISIT (OUTPATIENT)
Dept: DIABETES | Facility: CLINIC | Age: 24
End: 2024-12-04
Payer: COMMERCIAL

## 2024-12-04 ENCOUNTER — TELEPHONE (OUTPATIENT)
Dept: DIABETES | Facility: CLINIC | Age: 24
End: 2024-12-04

## 2024-12-04 DIAGNOSIS — E11.65 TYPE 2 DIABETES MELLITUS WITH HYPERGLYCEMIA, WITHOUT LONG-TERM CURRENT USE OF INSULIN: Primary | ICD-10-CM

## 2024-12-04 RX ORDER — INSULIN PMP CART,AUT,G6/7,CNTR
1 EACH SUBCUTANEOUS
Qty: 45 EACH | Refills: 0 | Status: SHIPPED | OUTPATIENT
Start: 2024-12-04

## 2024-12-04 RX ORDER — INSULIN ASPART 100 [IU]/ML
INJECTION, SOLUTION INTRAVENOUS; SUBCUTANEOUS
Qty: 40 ML | Refills: 11 | Status: SHIPPED | OUTPATIENT
Start: 2024-12-04

## 2024-12-04 RX ORDER — SEMAGLUTIDE 0.68 MG/ML
0.5 INJECTION, SOLUTION SUBCUTANEOUS
Qty: 3 ML | Refills: 3 | Status: SHIPPED | OUTPATIENT
Start: 2024-12-04

## 2024-12-04 NOTE — PROGRESS NOTES
The patient location is: [place]  The chief complaint leading to consultation is: Diabetes    Visit type: audiovisual    Face to Face time with patient: 17 minutes  25 minutes of total time spent on the encounter, which includes face to face time and non-face to face time preparing to see the patient (eg, review of tests), Obtaining and/or reviewing separately obtained history, Documenting clinical information in the electronic or other health record, Independently interpreting results (not separately reported) and communicating results to the patient/family/caregiver, or Care coordination (not separately reported).         Each patient to whom he or she provides medical services by telemedicine is:  (1) informed of the relationship between the physician and patient and the respective role of any other health care provider with respect to management of the patient; and (2) notified that he or she may decline to receive medical services by telemedicine and may withdraw from such care at any time.        Patient ID: Jana Luther is a 24 y.o. female.  Patient's current PCP is Agapito Jason MD.   Collaborating Physician: HERMILO Monsivais MD    Chief Complaint: Diabetes Mellitus    HPI  Jana Luther is a 24 y.o. White female presenting for a follow up for diabetes. She is a patient of ALFREDO Roy NP with [facility] 8/28/24      Patient has been diagnosed with type 2 diabetes since 2018 .  Complications related to diabetes: none  Recent diabetes related hospitalizations: none  Previous diabetes education:yes -[facility]    Current diet: counting carbs. Increased appetite off Mounjaro  Activity level: -    Current issues: Stopped Mounjaro due to n/v/d. Blood sugars running high. Having to change pod every 2 days due to insulin requirements.    Personal history of pancreatitis: denies  Personal history of abdominal surgery: denies  Personal history of thyroid surgery: denies  Family history of pancreatic cancer in first-degree  relative: denies  Family history of MTC/MEN/endocrine tumors: denies     Past Medical History:   Diagnosis Date    Diabetes mellitus, type 2      Social History     Socioeconomic History    Marital status:    Tobacco Use    Smoking status: Never    Smokeless tobacco: Never   Substance and Sexual Activity    Alcohol use: No     Alcohol/week: 0.0 standard drinks of alcohol    Drug use: No    Sexual activity: Yes     Partners: Female     Birth control/protection: None     Social Drivers of Health     Financial Resource Strain: High Risk (1/19/2024)    Overall Financial Resource Strain (CARDIA)     Difficulty of Paying Living Expenses: Hard   Food Insecurity: No Food Insecurity (1/19/2024)    Hunger Vital Sign     Worried About Running Out of Food in the Last Year: Never true     Ran Out of Food in the Last Year: Never true   Transportation Needs: No Transportation Needs (1/19/2024)    PRAPARE - Transportation     Lack of Transportation (Medical): No     Lack of Transportation (Non-Medical): No   Physical Activity: Insufficiently Active (1/19/2024)    Exercise Vital Sign     Days of Exercise per Week: 2 days     Minutes of Exercise per Session: 40 min   Stress: No Stress Concern Present (1/19/2024)    [employer] [place] [employer] - Occupational Stress Questionnaire     Feeling of Stress : Not at all   Housing Stability: Low Risk  (1/19/2024)    Housing Stability Vital Sign     Unable to Pay for Housing in the Last Year: No     Number of Places Lived in the Last Year: 1     Unstable Housing in the Last Year: No       Review of patient's allergies indicates:   Allergen Reactions    Metformin Diarrhea       CURRENT DM MEDICATIONS:   Diabetes Medications               insulin aspart U-100 (NOVOLOG FLEXPEN U-100 INSULIN) 100 unit/mL (3 mL) InPn pen Inject 10 Units into the skin 3 (three) times daily before meals. To replace Fiasp    insulin aspart U-100 (NOVOLOG) 100 unit/mL injection Use up to 100 units  "daily via insulin pump    insulin glargine U-300 conc (TOUJEO MAX U-300 SOLOSTAR) 300 unit/mL (3 mL) insulin pen Inject 50 Units into the skin once daily. To replace Lantus    tirzepatide (MOUNJARO) 5 mg/0.5 mL PnIj Inject 5 mg into the skin every 7 days.Not taking     Omnipod 5 - in automode         Past failed treatment(s) include:   Metformin - Gi issues  Ozempic - nausea  Jardiance - yeast    Meter/cgm: Dexcom G7  Blood glucose testing is performed regularly.   Patient is testing continuously times per day.  Any episodes of hypoglycemia? no   Glucose trends:   Per Dexcom download, for the last 14 days:    Average glucose of 207 mg/dL. She is 48% in range. 27% of readings are mildly elevated with 25% of readings > 250. 0% hypoglycemia. SD 69 mg/dL. Estimated GMI -%. Glycemic control is unstable - High postprandially with stopping Mounjaro        Her blood sugar in the clinic today was: NA  Lab Results   Component Value Date    POCGLU 250 (A) 03/03/2022       Statin: Not taking  ACE/ARB: Not taking    Labs reviewed and are noted below.    Screening or Prevention Patient's value Goal Complete/Controlled?   HgA1C Testing and Control   Lab Results   Component Value Date    HGBA1C 5.2 10/05/2024      Annually/Less than 8% Yes   Lipid profile : 12/09/2023 Annually Yes   LDL control Lab Results   Component Value Date    LDLCALC 117.2 12/09/2023    Annually/Less than 100 mg/dl  No   Nephropathy screening Lab Results   Component Value Date    MICALBCREAT 17.6 12/09/2023     No results found for: "PROTEINUA" Annually Yes   Blood pressure BP Readings from Last 1 Encounters:   01/30/24 122/80    Less than 140/90 Yes   Dilated retinal exam : 06/18/2021 Annually no   Foot exam   : 01/19/2024 Annually Yes     Glucose   Date Value Ref Range Status   12/09/2023 254 (H) 70 - 110 mg/dL Final     Anion Gap   Date Value Ref Range Status   12/09/2023 9 8 - 16 mmol/L Final     eGFR if    Date Value Ref Range Status "   07/12/2022 >60 >60 mL/min/1.73 m^2 Final     eGFR if non    Date Value Ref Range Status   07/12/2022 >60 >60 mL/min/1.73 m^2 Final     Comment:     Calculation used to obtain the estimated glomerular filtration  rate (eGFR) is the CKD-EPI equation.        Lab Results   Component Value Date    GLUTAMICACID 0.00 10/05/2024    CPEPTIDE 1.66 10/05/2024    TSH 2.056 12/09/2023     Lab Results   Component Value Date    CPEPTIDE 1.66 10/05/2024     Lab Results   Component Value Date    GLUTAMICACID 0.00 10/05/2024       Wt Readings from Last 3 Encounters:   09/06/24 0935 118.6 kg (261 lb 6.4 oz)   01/30/24 1617 116.8 kg (257 lb 8 oz)   01/19/24 1552 117.9 kg (259 lb 14.4 oz)       Review of Systems   Constitutional:  Negative for malaise/fatigue and weight loss.   Eyes:  Negative for blurred vision and double vision.   Respiratory:  Negative for shortness of breath.    Cardiovascular: Negative.    Gastrointestinal:  Positive for nausea.   Genitourinary:  Negative for frequency.   Musculoskeletal:  Negative for myalgias.   Neurological: Negative.    Psychiatric/Behavioral: Negative.         Physical Exam  Constitutional:       General: She is not in acute distress.     Appearance: Normal appearance. She is obese.   Pulmonary:      Effort: Pulmonary effort is normal.   Skin:     Coloration: Skin is not pale.   Neurological:      Mental Status: She is alert.   Psychiatric:         Mood and Affect: Mood normal.           Assessment & Plan      Type 2 diabetes mellitus with hyperglycemia, without long-term current use of insulin - not at goal  -     insulin aspart U-100 (NOVOLOG) 100 unit/mL injection; Use up to 130 units daily via insulin pump  Dispense: 40 mL; Refill: 11  -     semaglutide (OZEMPIC) 0.25 mg or 0.5 mg (2 mg/3 mL) pen injector; Inject 0.5 mg into the skin every 7 days.  Dispense: 3 mL; Refill: 3  -     insulin pump cart,auto,BT,G6/7 (OMNIPOD 5 G6-G7 PODS, GEN 5,) Crtg; Inject 1 each into the  skin every 48 hours.  Dispense: 45 each; Refill: 0    Retry low dose Ozempic at 0.25 mg weekly x 4 weeks, then titrate up slowly  Patient declined pump setting changes at this time  Encouraged to bolus before meals   Change pods every 48 hours         - Follow up: 2 weeks with Yadira    Visit today included increased complexity associated with the care of the episodic problem glucose control addressed and managing the longitudinal care of the patient due to the serious and/or complex managed problem(s) t2dm.

## 2024-12-04 NOTE — TELEPHONE ENCOUNTER
Received fax from [facility] requesting script for Omnipod intro kit with script sent in on today. Spoke with rep at pharmacy, advised to disregard request.

## 2024-12-04 NOTE — Clinical Note
Trying her back on low dose Ozempic. Said Mounjaro hurt and made her nauseated. She sees you in 2 weeks. Sent rx to [facility] for pods every 2 days

## 2024-12-11 DIAGNOSIS — E11.9 TYPE 2 DIABETES MELLITUS WITHOUT COMPLICATION: ICD-10-CM

## 2024-12-18 ENCOUNTER — OFFICE VISIT (OUTPATIENT)
Dept: DIABETES | Facility: CLINIC | Age: 24
End: 2024-12-18
Payer: COMMERCIAL

## 2024-12-18 DIAGNOSIS — E11.65 TYPE 2 DIABETES MELLITUS WITH HYPERGLYCEMIA, WITHOUT LONG-TERM CURRENT USE OF INSULIN: Primary | ICD-10-CM

## 2024-12-18 PROCEDURE — 1159F MED LIST DOCD IN RCRD: CPT | Mod: CPTII,95,, | Performed by: NURSE PRACTITIONER

## 2024-12-18 PROCEDURE — 3044F HG A1C LEVEL LT 7.0%: CPT | Mod: CPTII,95,, | Performed by: NURSE PRACTITIONER

## 2024-12-18 PROCEDURE — 99214 OFFICE O/P EST MOD 30 MIN: CPT | Mod: 95,,, | Performed by: NURSE PRACTITIONER

## 2024-12-18 PROCEDURE — 1160F RVW MEDS BY RX/DR IN RCRD: CPT | Mod: CPTII,95,, | Performed by: NURSE PRACTITIONER

## 2024-12-18 RX ORDER — INSULIN PMP CART,AUT,G6/7,CNTR
1 EACH SUBCUTANEOUS
Qty: 45 EACH | Refills: 0 | Status: SHIPPED | OUTPATIENT
Start: 2024-12-18

## 2024-12-18 NOTE — PROGRESS NOTES
Subjective:         Patient ID: Jana Luther is a 24 y.o. female.  Patient's current PCP is Agapito Jason MD.       Chief Complaint: No chief complaint on file.      HPI  Jana Luther is a 24 y.o. White female presenting for a follow up for diabetes. Patient has been diagnosed with diabetes since 2018 . On OP5 since 10/2024.    Complications related to diabetes:  HTN, Hyperlipidemia; denies Pancreatitis; denies Gastroparesis; denies DKA; denies Hx/family Hx of MEN2/MTC; reports Frequent UTIs/yeast infections (with menstrual cycles)    Past failed treatment include: Metformin- GI upset, Jardiance- yeast, ozempic- GI upset    Blood glucose testing is performed regularly, with the Dexcom- Interpretation of CGMS as follows: Average Glucose: 173 mg/dl; 5% Very High; 34% High; 61% In Range; 0% Low;  0% Very Low.     Compliance:The patient reports medication compliance and diet noncompliance some of the time.  Activity: Sedentary- none .     The patient location is: [place], [place]  The chief complaint leading to consultation is: DM follow up    Visit type: audiovisual    Face to Face time with patient: 30 minutes of total time spent on the encounter, which includes face to face time and non-face to face time preparing to see the patient (eg, review of tests), Obtaining and/or reviewing separately obtained history, Documenting clinical information in the electronic or other health record, Independently interpreting results (not separately reported) and communicating results to the patient/family/caregiver, or Care coordination (not separately reported). Each patient to whom he or she provides medical services by telemedicine is:  (1) informed of the relationship between the physician and patient and the respective role of any other health care provider with respect to management of the patient; and (2) notified that he or she may decline to receive medical services by telemedicine and may withdraw from such care at any  "time.         //   , There is no height or weight on file to calculate BMI.  Her blood sugar in clinic today is:   Lab Results   Component Value Date    POCGLU 250 (A) 03/03/2022       Labs reviewed and are noted below.  Her most recent A1C is:  Lab Results   Component Value Date    HGBA1C 5.2 10/05/2024    HGBA1C 7.9 (H) 07/13/2024    HGBA1C 7.5 (H) 04/09/2024     Lab Results   Component Value Date    CPEPTIDE 1.66 10/05/2024     Lab Results   Component Value Date    GLUTAMICACID 0.00 10/05/2024     Glucose   Date Value Ref Range Status   12/09/2023 254 (H) 70 - 110 mg/dL Final     Anion Gap   Date Value Ref Range Status   12/09/2023 9 8 - 16 mmol/L Final     eGFR if    Date Value Ref Range Status   07/12/2022 >60 >60 mL/min/1.73 m^2 Final     eGFR if non    Date Value Ref Range Status   07/12/2022 >60 >60 mL/min/1.73 m^2 Final     Comment:     Calculation used to obtain the estimated glomerular filtration  rate (eGFR) is the CKD-EPI equation.        Diabetes Management Status    Statin: Not taking  ACE/ARB: Not taking    Screening or Prevention Patient's value Goal Complete/Controlled?   HgA1C Testing and Control   Lab Results   Component Value Date    HGBA1C 5.2 10/05/2024      Annually/Less than 8% No   Lipid profile : 12/09/2023 Annually Yes   LDL control Lab Results   Component Value Date    LDLCALC 117.2 12/09/2023    Annually/Less than 100 mg/dl  No   Nephropathy screening Lab Results   Component Value Date    LABMICR 22.0 12/09/2023     No results found for: "PROTEINUA"  No results found for: "UTPCR"   Annually No   Blood pressure BP Readings from Last 1 Encounters:   01/30/24 122/80    Less than 140/90 Yes   Dilated retinal exam : 06/18/2021 Annually No   Foot exam   : 01/19/2024 Annually No       CURRENT DM MEDICATIONS:   Diabetes Medications               insulin aspart U-100 (NOVOLOG FLEXPEN U-100 INSULIN) 100 unit/mL (3 mL) InPn pen Inject 10 Units into the skin 3 " (three) times daily before meals. To replace Fiasp    insulin aspart U-100 (NOVOLOG) 100 unit/mL injection Use up to 130 units daily via insulin pump    insulin glargine U-300 conc (TOUJEO MAX U-300 SOLOSTAR) 300 unit/mL (3 mL) insulin pen Inject 50 Units into the skin once daily. To replace Lantus    semaglutide (OZEMPIC) 0.25 mg or 0.5 mg (2 mg/3 mL) pen injector Inject 0.5 mg into the skin every 7 days. Taking 0.25 mg              Review of Systems   Constitutional:  Negative for activity change and appetite change.        No symptoms of dehydration   Eyes:  Negative for visual disturbance.   Gastrointestinal:  Positive for constipation (mild occasional hard stools). Negative for vomiting.   Endocrine: Negative for polydipsia, polyphagia and polyuria.   Genitourinary:  Negative for dysuria and vaginal discharge.   Neurological:  Negative for syncope and weakness.   Psychiatric/Behavioral:  Negative for confusion.          Objective:      Physical Exam  Constitutional:       General: She is not in acute distress.     Appearance: She is not ill-appearing, toxic-appearing or diaphoretic.   Neurological:      Mental Status: She is alert.         Assessment:       1. Type 2 diabetes mellitus with hyperglycemia, without long-term current use of insulin             Plan:   Type 2 diabetes mellitus with hyperglycemia, without long-term current use of insulin  -     insulin pump cart,auto,BT,G6/7 (OMNIPOD 5 G6-G7 PODS, GEN 5,) Crtg; Inject 1 each into the skin every 48 hours.  Dispense: 45 each; Refill: 0          NITIAL SETTINGS: (per provider Yadira Roy)    Basal rate: 1.5/hr  Maximum basal: 3u/hr      Bolus Menu:  ISF: 1:25  Carb Ratio : 1:6.5  Blood glucose target: 120; correct above: 130  Active insulin: 4 hrs  Maximum bolus = 25 units  Reverse Correction: ON     Low pod insulin: 20 units  Pod expiration alarm: 4 hours    PLAN:   - Condition: uncontrolled    - Monitor blood glucose:  2x daily.Goals reviewed.   -  Hypoglycemia protocol: reviewed routinely; risk discussed  - Diet: reviewed; Encouraged healthy low fat, low carb diet and increase physical activity  - BP and LDL- Rec lifestyle modifications and follow up with PCP for management.   - Medication Changes:   Continue - she would like to take 0.25 mg for 4 weeks total, then increase to 0.5 mg, defer pump changes for now  - I explained the importance of routine foot and eye exams, advised to see PCP annually for physical exams and monitoring for any drug related complications   - ACE/ARB recommended unless contraindicated   - The patient was explained the above plan and given opportunity to ask questions.  She understands, chooses and consents to this plan and accepts all the risks, which include but are not limited to the risks mentioned above.   - Labs ordered as above  - Nurse visit:  deferred    - Follow up: 2 months     Pump failure plan:    Toujeo 50 units    Fiasp/ Novolog sliding scale        If blood pre-meal sugar is 151-200 take +2 units of Fiasp/Novolog  If blood pre-meal sugar is 201-250 take +4 units   If blood pre-meal sugar is 251-300 take +6 units   If blood pre-meal sugar is 301-350 take +8 units   If blood pre-meal sugar is 351-400+ take +10 units         A total of 30 minutes was spent in face to face time, of which over 50% was spent in counseling patient on disease process, complications, treatment, and side effects of medications.

## 2024-12-26 ENCOUNTER — PATIENT MESSAGE (OUTPATIENT)
Dept: DIABETES | Facility: CLINIC | Age: 24
End: 2024-12-26
Payer: COMMERCIAL

## 2025-02-12 ENCOUNTER — OFFICE VISIT (OUTPATIENT)
Dept: DIABETES | Facility: CLINIC | Age: 25
End: 2025-02-12
Payer: COMMERCIAL

## 2025-02-12 DIAGNOSIS — E11.65 TYPE 2 DIABETES MELLITUS WITH HYPERGLYCEMIA, WITHOUT LONG-TERM CURRENT USE OF INSULIN: Primary | ICD-10-CM

## 2025-02-12 RX ORDER — SEMAGLUTIDE 1.34 MG/ML
1 INJECTION, SOLUTION SUBCUTANEOUS
Qty: 9 ML | Refills: 1 | Status: SHIPPED | OUTPATIENT
Start: 2025-02-12 | End: 2026-02-12

## 2025-02-12 NOTE — PROGRESS NOTES
Subjective:         Patient ID: Jana Luther is a 24 y.o. female.  Patient's current PCP is Agapito Jason MD.       Chief Complaint: No chief complaint on file.      HPI  Jana Luther is a 24 y.o. White female presenting for a follow up for diabetes. Patient has been diagnosed with diabetes since 2018 . On OP5 since 10/2024.    Complications related to diabetes:  HTN, Hyperlipidemia; denies Pancreatitis; denies Gastroparesis; denies DKA; denies Hx/family Hx of MEN2/MTC; reports Frequent UTIs/yeast infections (with menstrual cycles)    Past failed treatment include: Metformin- GI upset, Jardiance- yeast, ozempic- GI upset    Blood glucose testing is performed regularly, with the Dexcom- Interpretation of CGMS as follows: Average Glucose: 157 mg/dl; 1% Very High; 21% High; 78% In Range; 0% Low;  1% Very Low. Some/rare lows after bolus    Compliance:The patient reports medication compliance and diet noncompliance some of the time- this has improved.  Activity: Moderately Active-     The patient location is: home, [place]  The chief complaint leading to consultation is: DM follow up    Visit type: audiovisual    Face to Face time with patient: 30 minutes of total time spent on the encounter, which includes face to face time and non-face to face time preparing to see the patient (eg, review of tests), Obtaining and/or reviewing separately obtained history, Documenting clinical information in the electronic or other health record, Independently interpreting results (not separately reported) and communicating results to the patient/family/caregiver, or Care coordination (not separately reported). Each patient to whom he or she provides medical services by telemedicine is:  (1) informed of the relationship between the physician and patient and the respective role of any other health care provider with respect to management of the patient; and (2) notified that he or she may decline to receive medical services by  "telemedicine and may withdraw from such care at any time.         //   , There is no height or weight on file to calculate BMI.  Her blood sugar in clinic today is:   Lab Results   Component Value Date    POCGLU 250 (A) 03/03/2022       Labs reviewed and are noted below.  Her most recent A1C is:  Lab Results   Component Value Date    HGBA1C 5.2 10/05/2024    HGBA1C 7.9 (H) 07/13/2024    HGBA1C 7.5 (H) 04/09/2024     Lab Results   Component Value Date    CPEPTIDE 1.66 10/05/2024     Lab Results   Component Value Date    GLUTAMICACID 0.00 10/05/2024     Glucose   Date Value Ref Range Status   12/09/2023 254 (H) 70 - 110 mg/dL Final     Anion Gap   Date Value Ref Range Status   12/09/2023 9 8 - 16 mmol/L Final     eGFR if    Date Value Ref Range Status   07/12/2022 >60 >60 mL/min/1.73 m^2 Final     eGFR if non    Date Value Ref Range Status   07/12/2022 >60 >60 mL/min/1.73 m^2 Final     Comment:     Calculation used to obtain the estimated glomerular filtration  rate (eGFR) is the CKD-EPI equation.        Diabetes Management Status    Statin: Not taking  ACE/ARB: Not taking    Screening or Prevention Patient's value Goal Complete/Controlled?   HgA1C Testing and Control   Lab Results   Component Value Date    HGBA1C 5.2 10/05/2024      Annually/Less than 8% No   Lipid profile : 12/09/2023 Annually Yes   LDL control Lab Results   Component Value Date    LDLCALC 117.2 12/09/2023    Annually/Less than 100 mg/dl  No   Nephropathy screening Lab Results   Component Value Date    LABMICR 22.0 12/09/2023     No results found for: "PROTEINUA"  No results found for: "UTPCR"   Annually No   Blood pressure BP Readings from Last 1 Encounters:   01/30/24 122/80    Less than 140/90 Yes   Dilated retinal exam : 06/18/2021 Annually No   Foot exam   : 01/19/2024 Annually No       CURRENT DM MEDICATIONS:   Diabetes Medications               insulin aspart U-100 (NOVOLOG FLEXPEN U-100 INSULIN) 100 unit/mL (3 " mL) InPn pen Inject 10 Units into the skin 3 (three) times daily before meals. To replace Fiasp    insulin aspart U-100 (NOVOLOG) 100 unit/mL injection Use up to 130 units daily via insulin pump    insulin glargine U-300 conc (TOUJEO MAX U-300 SOLOSTAR) 300 unit/mL (3 mL) insulin pen Inject 50 Units into the skin once daily. To replace Lantus    semaglutide (OZEMPIC) 0.25 mg or 0.5 mg (2 mg/3 mL) pen injector Inject 0.5 mg into the skin every 7 days. Taking 1 mg              Review of Systems   Constitutional:  Negative for activity change and appetite change.        No symptoms of dehydration   Eyes:  Negative for visual disturbance.   Gastrointestinal:  Positive for constipation (mild occasional hard stools). Negative for vomiting.   Endocrine: Negative for polydipsia, polyphagia and polyuria.   Genitourinary:  Negative for dysuria and vaginal discharge.   Neurological:  Negative for syncope and weakness.   Psychiatric/Behavioral:  Negative for confusion.          Objective:      Physical Exam  Constitutional:       General: She is not in acute distress.     Appearance: She is not ill-appearing, toxic-appearing or diaphoretic.   Neurological:      Mental Status: She is alert.         Assessment:       1. Type 2 diabetes mellitus with hyperglycemia, without long-term current use of insulin               Plan:   Type 2 diabetes mellitus with hyperglycemia, without long-term current use of insulin  -     semaglutide (OZEMPIC) 1 mg/dose (4 mg/3 mL); Inject 1 mg into the skin every 7 days.  Dispense: 9 mL; Refill: 1            NITIAL SETTINGS: (per provider Yadira Roy)    Basal rate: 1.5/hr  Maximum basal: 3u/hr      Bolus Menu:  ISF: 1:25  Carb Ratio : 1:6.5  Blood glucose target: 120; correct above: 130  Active insulin: 4 hrs  Maximum bolus = 25 units  Reverse Correction: ON     Low pod insulin: 20 units  Pod expiration alarm: 4 hours    PLAN:   - Condition: good control, improved   - Monitor blood glucose:  2x  daily.Goals reviewed.   - Hypoglycemia protocol: reviewed routinely; risk discussed  - Diet: reviewed; Encouraged healthy low fat, low carb diet and increase physical activity  - BP and LDL- Rec lifestyle modifications and follow up with PCP for management.   - Medication Changes:   Continue meds and pump settings, if low BG continue, I will raise the IC, she wants to work on diet  - I explained the importance of routine foot and eye exams, advised to see PCP annually for physical exams and monitoring for any drug related complications   - ACE/ARB recommended unless contraindicated   - The patient was explained the above plan and given opportunity to ask questions.  She understands, chooses and consents to this plan and accepts all the risks, which include but are not limited to the risks mentioned above.   - Labs ordered as above  - Nurse visit:  deferred    - Follow up: 3 months     Pump failure plan:    Toujeo 50 units    Fiasp/ Novolog sliding scale        If blood pre-meal sugar is 151-200 take +2 units of Fiasp/Novolog  If blood pre-meal sugar is 201-250 take +4 units   If blood pre-meal sugar is 251-300 take +6 units   If blood pre-meal sugar is 301-350 take +8 units   If blood pre-meal sugar is 351-400+ take +10 units         A total of 30 minutes was spent in face to face time, of which over 50% was spent in counseling patient on disease process, complications, treatment, and side effects of medications.

## 2025-02-21 ENCOUNTER — PATIENT MESSAGE (OUTPATIENT)
Dept: DIABETES | Facility: CLINIC | Age: 25
End: 2025-02-21
Payer: COMMERCIAL

## 2025-02-21 DIAGNOSIS — R94.6 ABNORMAL THYROID EXAM: Primary | ICD-10-CM

## 2025-02-24 ENCOUNTER — TELEPHONE (OUTPATIENT)
Dept: DIABETES | Facility: CLINIC | Age: 25
End: 2025-02-24
Payer: COMMERCIAL

## 2025-02-24 NOTE — TELEPHONE ENCOUNTER
I called the [facility] in [facility] to see what they needed from the [facility] in order for the patient to receive her Ozempic. I spoke with  who stated that they needed a PA done on 's Ozempic. I explained that once the office receives the PA we will start working on it and get it sent back over.

## 2025-02-24 NOTE — TELEPHONE ENCOUNTER
----- Message from Sindhu sent at 2/24/2025 10:18 AM CST -----  Type:  Pharmacy Calling to Clarify an RXName of Caller:PharmacyPharmacy Name:[facility] [place] [address] [address]Zdrvr: [phone] Fax: [phone] Name:semaglutide (OZEMPIC) 1 mg/dose (4 mg/3 mL) 9 mL 1 2/12/2025 2/12/2026 NoSig - Route: Inject 1 mg into the skin every 7 days. - SubcutaneousSent to pharmacy as: semaglutide (OZEMPIC) 1 mg/dose (4 mg/3 mL)Class: NormalOrder: [id number]/Time Signed: 2/12/2025 17:19    E-Prescribing Status: Receipt confirmed by pharmacy (2/12/2025  5:31 PM CST)Prior authorization: Closed - OtherWhat do they need to clarify?:prior auth statusBest Call Back Number:[phone] Information: requesting a call back to check the status of the prior auth

## 2025-02-26 ENCOUNTER — PATIENT MESSAGE (OUTPATIENT)
Dept: DIABETES | Facility: CLINIC | Age: 25
End: 2025-02-26
Payer: COMMERCIAL

## 2025-02-26 PROBLEM — Z00.00 WELLNESS EXAMINATION: Status: ACTIVE | Noted: 2025-02-26

## 2025-02-26 PROBLEM — Z79.4 TYPE 2 DIABETES MELLITUS WITH HYPERGLYCEMIA, WITH LONG-TERM CURRENT USE OF INSULIN: Status: ACTIVE | Noted: 2022-02-01

## 2025-02-26 PROBLEM — R09.89 LABILE BLOOD PRESSURE: Status: ACTIVE | Noted: 2025-02-26

## 2025-03-19 ENCOUNTER — PATIENT MESSAGE (OUTPATIENT)
Dept: DIABETES | Facility: CLINIC | Age: 25
End: 2025-03-19
Payer: COMMERCIAL

## 2025-03-25 DIAGNOSIS — E11.65 TYPE 2 DIABETES MELLITUS WITH HYPERGLYCEMIA, WITHOUT LONG-TERM CURRENT USE OF INSULIN: Primary | ICD-10-CM

## 2025-03-26 NOTE — TELEPHONE ENCOUNTER
PA determination letter received: no PA required. Approval already on file.  Letter uploaded to media.

## 2025-04-21 ENCOUNTER — PATIENT MESSAGE (OUTPATIENT)
Dept: DIABETES | Facility: CLINIC | Age: 25
End: 2025-04-21
Payer: COMMERCIAL

## 2025-04-21 DIAGNOSIS — E11.65 TYPE 2 DIABETES MELLITUS WITH HYPERGLYCEMIA, WITHOUT LONG-TERM CURRENT USE OF INSULIN: ICD-10-CM

## 2025-05-19 DIAGNOSIS — E11.65 TYPE 2 DIABETES MELLITUS WITH HYPERGLYCEMIA, WITHOUT LONG-TERM CURRENT USE OF INSULIN: Primary | ICD-10-CM

## 2025-05-19 RX ORDER — TIRZEPATIDE 10 MG/.5ML
10 INJECTION, SOLUTION SUBCUTANEOUS
Qty: 4 PEN | Refills: 5 | Status: SHIPPED | OUTPATIENT
Start: 2025-05-19

## 2025-06-05 ENCOUNTER — OFFICE VISIT (OUTPATIENT)
Dept: DIABETES | Facility: CLINIC | Age: 25
End: 2025-06-05
Payer: COMMERCIAL

## 2025-06-05 DIAGNOSIS — E11.65 TYPE 2 DIABETES MELLITUS WITH HYPERGLYCEMIA, WITHOUT LONG-TERM CURRENT USE OF INSULIN: Primary | ICD-10-CM

## 2025-06-05 PROCEDURE — 1160F RVW MEDS BY RX/DR IN RCRD: CPT | Mod: CPTII,95,, | Performed by: NURSE PRACTITIONER

## 2025-06-05 PROCEDURE — 1159F MED LIST DOCD IN RCRD: CPT | Mod: CPTII,95,, | Performed by: NURSE PRACTITIONER

## 2025-06-05 PROCEDURE — 3044F HG A1C LEVEL LT 7.0%: CPT | Mod: CPTII,95,, | Performed by: NURSE PRACTITIONER

## 2025-06-05 PROCEDURE — 98006 SYNCH AUDIO-VIDEO EST MOD 30: CPT | Mod: 95,,, | Performed by: NURSE PRACTITIONER

## 2025-06-05 PROCEDURE — G2211 COMPLEX E/M VISIT ADD ON: HCPCS | Mod: 95,,, | Performed by: NURSE PRACTITIONER

## 2025-06-05 RX ORDER — TIRZEPATIDE 12.5 MG/.5ML
12.5 INJECTION, SOLUTION SUBCUTANEOUS
Qty: 4 PEN | Refills: 5 | Status: SHIPPED | OUTPATIENT
Start: 2025-06-05

## 2025-07-15 PROBLEM — R09.81 SINUS CONGESTION: Status: RESOLVED | Noted: 2024-01-19 | Resolved: 2025-07-15

## 2025-07-15 PROBLEM — Z00.00 WELLNESS EXAMINATION: Status: RESOLVED | Noted: 2025-02-26 | Resolved: 2025-07-15

## 2025-07-15 PROBLEM — R09.89 LABILE BLOOD PRESSURE: Status: RESOLVED | Noted: 2025-02-26 | Resolved: 2025-07-15

## 2025-07-15 PROBLEM — M54.50 ACUTE LEFT-SIDED LOW BACK PAIN WITHOUT SCIATICA: Status: RESOLVED | Noted: 2022-06-07 | Resolved: 2025-07-15

## 2025-07-16 ENCOUNTER — TELEPHONE (OUTPATIENT)
Dept: HEMATOLOGY/ONCOLOGY | Facility: CLINIC | Age: 25
End: 2025-07-16
Payer: COMMERCIAL

## 2025-07-16 NOTE — TELEPHONE ENCOUNTER
Hematology referral sent to in basket    Spoke with pt and scheduled first available appt. Pt accepted appt and VU of date/time/location.

## 2025-07-30 ENCOUNTER — OFFICE VISIT (OUTPATIENT)
Dept: URGENT CARE | Facility: CLINIC | Age: 25
End: 2025-07-30
Payer: COMMERCIAL

## 2025-07-30 VITALS
BODY MASS INDEX: 41.59 KG/M2 | RESPIRATION RATE: 18 BRPM | HEART RATE: 110 BPM | OXYGEN SATURATION: 99 % | TEMPERATURE: 100 F | DIASTOLIC BLOOD PRESSURE: 65 MMHG | WEIGHT: 265 LBS | SYSTOLIC BLOOD PRESSURE: 110 MMHG | HEIGHT: 67 IN

## 2025-07-30 DIAGNOSIS — U07.1 COVID-19 VIRUS DETECTED: ICD-10-CM

## 2025-07-30 DIAGNOSIS — R50.9 FEVER, UNSPECIFIED FEVER CAUSE: ICD-10-CM

## 2025-07-30 DIAGNOSIS — U07.1 COVID-19: Primary | ICD-10-CM

## 2025-07-30 LAB
CTP QC/QA: YES
CTP QC/QA: YES
POC MOLECULAR INFLUENZA A AGN: NEGATIVE
POC MOLECULAR INFLUENZA B AGN: NEGATIVE
SARS-COV+SARS-COV-2 AG RESP QL IA.RAPID: POSITIVE

## 2025-07-30 PROCEDURE — 99214 OFFICE O/P EST MOD 30 MIN: CPT | Mod: S$GLB,,, | Performed by: NURSE PRACTITIONER

## 2025-07-30 PROCEDURE — 87811 SARS-COV-2 COVID19 W/OPTIC: CPT | Mod: QW,S$GLB,, | Performed by: NURSE PRACTITIONER

## 2025-07-30 PROCEDURE — 87502 INFLUENZA DNA AMP PROBE: CPT | Mod: QW,S$GLB,, | Performed by: NURSE PRACTITIONER

## 2025-07-30 RX ORDER — NIRMATRELVIR AND RITONAVIR 300-100 MG
KIT ORAL
Qty: 30 TABLET | Refills: 0 | Status: SHIPPED | OUTPATIENT
Start: 2025-07-30 | End: 2025-08-04

## 2025-07-30 NOTE — PATIENT INSTRUCTIONS
Continue mask wearing.  Paxlovid prescribed.  Monitor blood glucose.  You must understand that you've received an Urgent Care treatment only and that you may be released before all your medical problems are known or treated. You, the patient, will arrange for follow up care as instructed.  Follow up with your PCP or specialty clinic as directed in the next 1-2 weeks if not improved or as needed.  You can call [phone] to schedule an appointment with the appropriate provider.  If your condition worsens we recommend that you receive another evaluation at the emergency room immediately or contact your primary medical clinics after hours call service to discuss your concerns.  Please return here or go to the Emergency Department for any concerns or worsening of condition.

## 2025-07-30 NOTE — PROGRESS NOTES
"Subjective:      Patient ID: Jana Luther is a 25 y.o. female.    Vitals:  height is 5' 7" (1.702 m) and weight is 120.2 kg (265 lb). Her oral temperature is 99.9 °F (37.7 °C). Her blood pressure is 110/65 and her pulse is 110. Her respiration is 18 and oxygen saturation is 99%.     Chief Complaint: Fever    25 year old patient presents herself with fever, cough, headache, body aches, sore throat, and congestion that she has been experiencing for the past day. Pt stated that she has been taking zyrtec and amoxicillin that has given her little to no relief. Pain 03/10    Fever   This is a new problem. The current episode started 1 day ago. The problem occurs constantly. The problem has been unchanged. The maximum temperature noted was 101 to 101.9 F. The temperature was taken using an oral thermometer. Associated symptoms include congestion, headaches, muscle aches and a sore throat. The treatment provided mild relief.       Constitution: Positive for fever.   HENT:  Positive for congestion and sore throat.    Neurological:  Positive for headaches.      Objective:     Physical Exam   Constitutional: She is oriented to person, place, and time. She appears well-developed. She is cooperative.  Non-toxic appearance. She does not appear ill. No distress.   HENT:   Head: Normocephalic and atraumatic.   Ears:   Right Ear: Hearing, tympanic membrane, external ear and ear canal normal.   Left Ear: Hearing, tympanic membrane, external ear and ear canal normal.   Nose: Nose normal. No mucosal edema, rhinorrhea or nasal deformity. No epistaxis. Right sinus exhibits no maxillary sinus tenderness and no frontal sinus tenderness. Left sinus exhibits no maxillary sinus tenderness and no frontal sinus tenderness.   Mouth/Throat: Uvula is midline, oropharynx is clear and moist and mucous membranes are normal. No trismus in the jaw. Normal dentition. No uvula swelling. No oropharyngeal exudate, posterior oropharyngeal edema or posterior " oropharyngeal erythema.   Eyes: Conjunctivae and lids are normal. No scleral icterus.   Neck: Trachea normal and phonation normal. Neck supple. No edema present. No erythema present. No neck rigidity present.   Cardiovascular: Normal rate, regular rhythm, normal heart sounds and normal pulses.   Pulmonary/Chest: Effort normal and breath sounds normal. No respiratory distress. She has no decreased breath sounds. She has no rhonchi.   Abdominal: Normal appearance.   Musculoskeletal: Normal range of motion.         General: No deformity. Normal range of motion.   Neurological: She is alert and oriented to person, place, and time. She exhibits normal muscle tone. Coordination normal.   Skin: Skin is warm, dry, intact, not diaphoretic and not pale.   Psychiatric: Her speech is normal and behavior is normal. Judgment and thought content normal.   Nursing note and vitals reviewed.      Assessment:     1. COVID-19    2. Fever, unspecified fever cause        Plan:     Results for orders placed or performed in visit on 07/30/25   POCT Influenza A/B MOLECULAR    Collection Time: 07/30/25 10:13 AM   Result Value Ref Range    POC Molecular Influenza A Ag Negative Negative    POC Molecular Influenza B Ag Negative Negative     Acceptable Yes    SARS Coronavirus 2 Antigen, POCT Manual Read    Collection Time: 07/30/25 10:13 AM   Result Value Ref Range    SARS Coronavirus 2 Antigen Positive (A) Negative, Presumptive Negative     Acceptable Yes      Informed of positive Covid result today. Discussed treatment with antiviral medication. Pt requesting antiviral CDC guidelines regarding Covid discussed with pt. Advised to monitor blood glucose closely. F/u with PCP. ER precautions.     COVID-19  -     nirmatrelvir-ritonavir (PAXLOVID) 300 mg (150 mg x 2)-100 mg copackaged tablets (EUA); Take 3 tablets by mouth 2 (two) times daily. Each dose contains 2 nirmatrelvir (pink tablets) and 1 ritonavir (white  tablet). Take all 3 tablets together  Dispense: 30 tablet; Refill: 0    Fever, unspecified fever cause  -     POCT Influenza A/B MOLECULAR  -     SARS Coronavirus 2 Antigen, POCT Manual Read      Patient Instructions   Continue mask wearing.  Paxlovid prescribed.  Monitor blood glucose.  You must understand that you've received an Urgent Care treatment only and that you may be released before all your medical problems are known or treated. You, the patient, will arrange for follow up care as instructed.  Follow up with your PCP or specialty clinic as directed in the next 1-2 weeks if not improved or as needed.  You can call [phone] to schedule an appointment with the appropriate provider.  If your condition worsens we recommend that you receive another evaluation at the emergency room immediately or contact your primary medical clinics after hours call service to discuss your concerns.  Please return here or go to the Emergency Department for any concerns or worsening of condition.

## 2025-08-22 ENCOUNTER — PATIENT MESSAGE (OUTPATIENT)
Dept: DIABETES | Facility: CLINIC | Age: 25
End: 2025-08-22
Payer: COMMERCIAL

## 2025-08-26 ENCOUNTER — PATIENT MESSAGE (OUTPATIENT)
Dept: HEMATOLOGY/ONCOLOGY | Facility: CLINIC | Age: 25
End: 2025-08-26
Payer: COMMERCIAL

## 2025-08-26 ENCOUNTER — OFFICE VISIT (OUTPATIENT)
Dept: HEMATOLOGY/ONCOLOGY | Facility: CLINIC | Age: 25
End: 2025-08-26
Payer: COMMERCIAL

## 2025-08-26 DIAGNOSIS — R16.2 HEPATOSPLENOMEGALY: ICD-10-CM

## 2025-08-26 DIAGNOSIS — D72.829 LEUKOCYTOSIS, UNSPECIFIED TYPE: Primary | ICD-10-CM

## 2025-08-26 DIAGNOSIS — Z80.3 FAMILY HISTORY OF BREAST CANCER: ICD-10-CM

## 2025-08-26 DIAGNOSIS — Z80.0 FAMILY HISTORY OF PANCREATIC CANCER: ICD-10-CM

## 2025-08-26 DIAGNOSIS — D75.839 THROMBOCYTOSIS: ICD-10-CM

## 2025-08-26 PROCEDURE — 98002 SYNCH AUDIO-VIDEO NEW MOD 45: CPT | Mod: 95,,, | Performed by: INTERNAL MEDICINE

## 2025-08-26 PROCEDURE — G2211 COMPLEX E/M VISIT ADD ON: HCPCS | Mod: 95,,, | Performed by: INTERNAL MEDICINE

## 2025-08-26 PROCEDURE — 3044F HG A1C LEVEL LT 7.0%: CPT | Mod: CPTII,95,, | Performed by: INTERNAL MEDICINE

## 2025-08-27 ENCOUNTER — PATIENT MESSAGE (OUTPATIENT)
Dept: HEMATOLOGY/ONCOLOGY | Facility: CLINIC | Age: 25
End: 2025-08-27
Payer: COMMERCIAL

## 2025-08-27 ENCOUNTER — TELEPHONE (OUTPATIENT)
Dept: HEMATOLOGY/ONCOLOGY | Facility: CLINIC | Age: 25
End: 2025-08-27
Payer: COMMERCIAL